# Patient Record
Sex: MALE | Race: OTHER | NOT HISPANIC OR LATINO
[De-identification: names, ages, dates, MRNs, and addresses within clinical notes are randomized per-mention and may not be internally consistent; named-entity substitution may affect disease eponyms.]

---

## 2023-05-16 PROBLEM — Z00.00 ENCOUNTER FOR PREVENTIVE HEALTH EXAMINATION: Status: ACTIVE | Noted: 2023-05-16

## 2023-05-17 ENCOUNTER — APPOINTMENT (OUTPATIENT)
Dept: UROLOGY | Facility: CLINIC | Age: 43
End: 2023-05-17
Payer: COMMERCIAL

## 2023-05-18 ENCOUNTER — APPOINTMENT (OUTPATIENT)
Dept: UROLOGY | Facility: CLINIC | Age: 43
End: 2023-05-18
Payer: COMMERCIAL

## 2023-05-18 VITALS
OXYGEN SATURATION: 97 % | RESPIRATION RATE: 16 BRPM | SYSTOLIC BLOOD PRESSURE: 122 MMHG | DIASTOLIC BLOOD PRESSURE: 69 MMHG | BODY MASS INDEX: 23.1 KG/M2 | HEIGHT: 71 IN | WEIGHT: 165 LBS | TEMPERATURE: 97.5 F | HEART RATE: 66 BPM

## 2023-05-18 PROCEDURE — 99204 OFFICE O/P NEW MOD 45 MIN: CPT

## 2023-05-20 NOTE — HISTORY OF PRESENT ILLNESS
[FreeTextEntry1] : Language: English\par Date of First visit: 05/16/2023 \par Accompanied by: self\par Contact info: \par Referring Provider/PCP: Dr. white\par Fax: \par \par \par \par CC/ Problem List:\par \par ===============================================================================\par FIRST VISIT / Summary:\par Very pleasant 42 year old M here for ED, he notes intermittent issues prior to ejaculation. Still has morning erections. He rates around 1-5 / 10 recently. He prior had cialis with good effect. We reviewed medication instructions/side effects and ER precautions.\par \par -------------------------------------------------------------------------------------------\par INTERVAL VISITS:\par \par ===============================================================================\par \par PMH: back herniated disc\par Meds: vitamins\par All: nkda\par FHx: No  malignancies. Father stent late 60's, and grandfather with CAD / MI\par SocHx: nonsmoker, social etoh\par \par PSH: none\par \par \par ROS: Review of Systems is as per HPI unless otherwise denoted below\par \par \par ===============================================================================\par DATA: \par \par LABS (SELECTED):---------------------------------------------------------------------------------------------------\par \par \par RADS:-------------------------------------------------------------------------------------------------------------------\par \par \par PATHOLOGY/CYTOLOGY:-------------------------------------------------------------------------------------------\par \par \par VOIDING STUDIES: ----------------------------------------------------------------------------------------------------\par \par \par STONE STUDIES: (Analysis/LLSA)----------------------------------------------------------------------------------\par \par \par PROCEDURES: -----------------------------------------------------------------------------------------------\par \par \par \par \par ===============================================================================\par \par PHYSICAL EXAM:\par \par GEN: AAOx3, NAD\par \par PSYCH: Appropriate Behavior, Affect Congruent\par \par Lungs: No labored breathing\par \par GAIT: Gait normal, Stability good\par \par ABD: no suprapubic or CVAT\par \par \par  FOCUSED: ----------------------------------------------------------------------------------------------------------------\par \par \par =======================================================================================\par DISCUSSION: \par PDE5 inhibitor information:\par The risks of this medication include facial redness and/or flushing, reflux (indigestion), headache, back pain, an erection that won't go away, chest pain or heart attack, dizziness, drop in blood pressure, loss of vision, blue vision, blurry vision and loss of hearing. The patient understands that he cannot take together with nitrates and that should he develop chest pain, he must alert emergency personnel if he has taken within the last 24 hours.  \par \par I recommend that you take the first dose by yourself so you can get acquainted with how this medication makes you feel and whether or not you will have any of the above side effects. The medication may work as soon as 20 minutes on an empty stomach but it can take up to 60 minutes (or longer) on a full stomach. The medication does requires sexual stimulation to work. If you take it without sexual stimulation, you will not get an erection. Please come to the ER for an erection that won't go away, chest pain, or loss of vision or hearing. The patient understood all of these instructions.\par \par The patient has been screened for potential medication interactions. He is not on any po antifungals or HIV meds (protease inhibitors, NNRTI's). \par \par =======================================================================================\par ASSESSMENT and PLAN\par \par \par 1. ED\par - desires trial of both moderate sildenafil and tadalafil to decide which he prefers. Knows to take separately, not more often than every othr day. Can start with 1/2 tablet and take up to 2 tablets.\par - Follow-up in 6 months\par \par 2. Testosterone measurement\par - he will decide if he wishes to have hormonal labs checked. Orders placed\par \par =======================================================================================\par \par Thank you for allowing me to assist in the care of your patient. Should you have any questions please do not hesitate to reach out to me.\par \par \par Og Hernandes MD                                                         \par \par [place] office:                                                             [place] Office:\par \par [facility]                                [facility]\par [facility] [place]\par [address], [address]                                         [address], 1st floor\par [place], [place] [id number] [place] [id number]\par T: [phone]                                                              T: [phone]\par F: [phone]                                                              F: [phone]

## 2023-09-05 ENCOUNTER — APPOINTMENT (OUTPATIENT)
Dept: UROLOGY | Facility: CLINIC | Age: 43
End: 2023-09-05
Payer: COMMERCIAL

## 2023-09-05 VITALS
HEIGHT: 71 IN | DIASTOLIC BLOOD PRESSURE: 64 MMHG | TEMPERATURE: 97.5 F | HEART RATE: 73 BPM | RESPIRATION RATE: 16 BRPM | OXYGEN SATURATION: 96 % | BODY MASS INDEX: 23.1 KG/M2 | SYSTOLIC BLOOD PRESSURE: 107 MMHG | WEIGHT: 165 LBS

## 2023-09-05 PROCEDURE — 99214 OFFICE O/P EST MOD 30 MIN: CPT

## 2023-09-05 RX ORDER — TADALAFIL 20 MG/1
20 TABLET ORAL
Qty: 30 | Refills: 3 | Status: ACTIVE | COMMUNITY
Start: 2023-09-05 | End: 1900-01-01

## 2023-09-05 RX ORDER — TADALAFIL 10 MG/1
10 TABLET, FILM COATED ORAL
Qty: 30 | Refills: 3 | Status: COMPLETED | COMMUNITY
Start: 2023-05-18 | End: 2023-09-05

## 2023-09-05 RX ORDER — SILDENAFIL 50 MG/1
50 TABLET ORAL
Qty: 30 | Refills: 3 | Status: COMPLETED | COMMUNITY
Start: 2023-05-18 | End: 2023-09-05

## 2023-09-05 RX ORDER — SILDENAFIL 100 MG/1
100 TABLET, FILM COATED ORAL
Qty: 30 | Refills: 4 | Status: ACTIVE | COMMUNITY
Start: 2023-09-05 | End: 1900-01-01

## 2023-09-05 NOTE — HISTORY OF PRESENT ILLNESS
[FreeTextEntry1] : Language: English Date of First visit: 05/16/2023 Accompanied by: self Contact info: Referring Provider/PCP: Dr. white Fax:  CC/ Problem List: ED =============================================================================== FIRST VISIT / Summary: Very pleasant 42 year old M here for ED, he notes intermittent issues prior to ejaculation. Still has morning erections. He rates around 1-5 / 10 recently. He prior had cialis with good effect. We reviewed medication instructions/side effects and ER precautions. ------------------------------------------------------------------------------------------- INTERVAL VISITS: The patient's medications and allergies were reviewed and edited below. Dated 09/05/2023  He is doing fine with medications. needed to double dose. =============================================================================== PMH: back herniated disc Meds: vitamins All: nkda FHx: No  malignancies. Father stent late 60's, and grandfather with CAD / MI SocHx: nonsmoker, social etoh  PSH: none  ROS: Review of Systems is as per HPI unless otherwise denoted below  =============================================================================== DATA: LABS (SELECTED):---------------------------------------------------------------------------------------------------   RADS:-------------------------------------------------------------------------------------------------------------------   PATHOLOGY/CYTOLOGY:-------------------------------------------------------------------------------------------   VOIDING STUDIES: ----------------------------------------------------------------------------------------------------   STONE STUDIES: (Analysis/LLSA)----------------------------------------------------------------------------------   PROCEDURES: -----------------------------------------------------------------------------------------------    ===============================================================================  PHYSICAL EXAM    FOCUSED: ----------------------------------------------------------------------------------------------------------------   =======================================================================================  DISCUSSION:  ======================================================================================= ASSESSMENT and PLAN 1. ED - desires trial of both moderate sildenafil and tadalafil to decide which he prefers. Knows to take separately, not more often than every other day. - Follow-up in 6 months  2. Testosterone measurement - he will decide if he wishes to have T  checked. Order placed  ======================================================================================= The total time personally spent preparing for this visit (reviewing test results, obtaining external history) and during the visit (ordering tests/medications, spent face to face with the patient / family and counseling them on the above), as well as after the visit (on clinical documentation and coordination with other care providers) was approximately 30 minutes.  Thank you for allowing me to assist in the care of your patient. Should you have any questions please do not hesitate to reach out to me.  Og Hernandes MD                                                             [facility]  [place] Office: [address], [address]    [place] [address]     T: [phone]     F: [phone]      [place] Office: [address] [place] [address], 1st [place] [address] T: [phone] F: [phone]

## 2023-09-08 LAB
TESTOST FREE SERPL-MCNC: 13 PG/ML
TESTOST SERPL-MCNC: 409 NG/DL

## 2023-11-10 ENCOUNTER — APPOINTMENT (OUTPATIENT)
Dept: UROLOGY | Facility: CLINIC | Age: 43
End: 2023-11-10
Payer: COMMERCIAL

## 2023-11-10 VITALS
HEART RATE: 77 BPM | WEIGHT: 165 LBS | OXYGEN SATURATION: 98 % | TEMPERATURE: 97.4 F | DIASTOLIC BLOOD PRESSURE: 72 MMHG | BODY MASS INDEX: 23.1 KG/M2 | RESPIRATION RATE: 16 BRPM | SYSTOLIC BLOOD PRESSURE: 121 MMHG | HEIGHT: 71 IN

## 2023-11-10 PROCEDURE — 99214 OFFICE O/P EST MOD 30 MIN: CPT

## 2023-11-11 LAB
ANION GAP SERPL CALC-SCNC: 13 MMOL/L
BUN SERPL-MCNC: 19 MG/DL
CALCIUM SERPL-MCNC: 9.6 MG/DL
CHLORIDE SERPL-SCNC: 103 MMOL/L
CHOLEST SERPL-MCNC: 162 MG/DL
CO2 SERPL-SCNC: 20 MMOL/L
CREAT SERPL-MCNC: 1.1 MG/DL
CRP SERPL HS-MCNC: 0.2 MG/L
EGFR: 86 ML/MIN/1.73M2
ESTIMATED AVERAGE GLUCOSE: 97 MG/DL
GLUCOSE SERPL-MCNC: 110 MG/DL
HBA1C MFR BLD HPLC: 5 %
HDLC SERPL-MCNC: 52 MG/DL
LDLC SERPL CALC-MCNC: 97 MG/DL
NONHDLC SERPL-MCNC: 110 MG/DL
POTASSIUM SERPL-SCNC: 4.1 MMOL/L
SODIUM SERPL-SCNC: 137 MMOL/L
TRIGL SERPL-MCNC: 69 MG/DL

## 2023-11-15 LAB — APO LP(A) SERPL-MCNC: 74.9 NMOL/L

## 2024-01-12 ENCOUNTER — LABORATORY RESULT (OUTPATIENT)
Age: 44
End: 2024-01-12

## 2024-01-12 ENCOUNTER — APPOINTMENT (OUTPATIENT)
Dept: CARDIOLOGY | Facility: CLINIC | Age: 44
End: 2024-01-12
Payer: COMMERCIAL

## 2024-01-12 ENCOUNTER — NON-APPOINTMENT (OUTPATIENT)
Age: 44
End: 2024-01-12

## 2024-01-12 VITALS
HEIGHT: 71 IN | BODY MASS INDEX: 23.1 KG/M2 | RESPIRATION RATE: 16 BRPM | SYSTOLIC BLOOD PRESSURE: 110 MMHG | OXYGEN SATURATION: 99 % | WEIGHT: 165 LBS | TEMPERATURE: 97.5 F | DIASTOLIC BLOOD PRESSURE: 78 MMHG | HEART RATE: 75 BPM

## 2024-01-12 PROCEDURE — 93000 ELECTROCARDIOGRAM COMPLETE: CPT

## 2024-01-12 PROCEDURE — 99203 OFFICE O/P NEW LOW 30 MIN: CPT | Mod: 25

## 2024-01-19 DIAGNOSIS — E78.00 PURE HYPERCHOLESTEROLEMIA, UNSPECIFIED: ICD-10-CM

## 2024-01-19 RX ORDER — ROSUVASTATIN CALCIUM 20 MG/1
20 TABLET, FILM COATED ORAL
Qty: 90 | Refills: 1 | Status: ACTIVE | COMMUNITY
Start: 2024-01-19 | End: 1900-01-01

## 2024-01-22 NOTE — PHYSICAL EXAM
[Well Developed] : well developed [Well Nourished] : well nourished [No Acute Distress] : no acute distress [Normal Conjunctiva] : normal conjunctiva [Normal Venous Pressure] : normal venous pressure [No Carotid Bruit] : no carotid bruit [Normal S1, S2] : normal S1, S2 [No Murmur] : no murmur [No Rub] : no rub [No Gallop] : no gallop [Clear Lung Fields] : clear lung fields [Good Air Entry] : good air entry [No Respiratory Distress] : no respiratory distress  [Soft] : abdomen soft [Non Tender] : non-tender [No Masses/organomegaly] : no masses/organomegaly [Normal Bowel Sounds] : normal bowel sounds [Normal Gait] : normal gait [No Edema] : no edema [No Cyanosis] : no cyanosis [No Clubbing] : no clubbing [No Varicosities] : no varicosities [No Rash] : no rash [No Skin Lesions] : no skin lesions [Moves all extremities] : moves all extremities [No Focal Deficits] : no focal deficits [Normal Speech] : normal speech [Alert and Oriented] : alert and oriented [Normal memory] : normal memory [5th Left ICS - MCL] : palpated at the 5th LICS in the midclavicular line [Normal] : normal [No Precordial Heave] : no precordial heave was noted [Normal Rate] : normal [Normal S1] : normal S1 [Normal S2] : normal S2 [II] : a grade 2 [No Pitting Edema] : no pitting edema present [2+] : left 2+ [No Abnormalities] : the abdominal aorta was not enlarged and no bruit was heard [S3] : no S3 [S4] : no S4 [Right Carotid Bruit] : no bruit heard over the right carotid [Left Carotid Bruit] : no bruit heard over the left carotid [Right Femoral Bruit] : no bruit heard over the right femoral artery [Left Femoral Bruit] : no bruit heard over the left femoral artery

## 2024-01-22 NOTE — REASON FOR VISIT
[CV Risk Factors and Non-Cardiac Disease] : CV risk factors and non-cardiac disease [FreeTextEntry1] : This is a 43-year-old male with a past medical history of erectile dysfunction who presents cardiac consultation.  He states that he has persistent erectile dysfunction that was being managed by a urologist. He states that he takes sildenafil 100 mg and tadalafil 20 mg and they do not work as well as they used to and he was referred for an evaluation by his urologist. He also states that 3-4 weeks ago he experienced a chest tightness only on his left side when he took a deep breath in. The pain resolved in 3-4 hours. It was not brought on by anything in particular and spontaneously resolved. He otherwise denies shortness of breath, dizziness, syncope.   He will drink socially. He has never smoked cigarettes but admits to smoking marijuana socially. 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 He will occasionally drink coffee and tea throughout the work day.   Family history significant for father - stent @ 65, paternal grandfather had potential MI, and pacemaker. He has a sister and brother who are healthy.   He does not have a PCP.

## 2024-01-22 NOTE — DISCUSSION/SUMMARY
[FreeTextEntry1] : This is a 43-year-old male with a past medical history of erectile dysfunction who presents cardiac consultation.  He states that he has persistent erectile dysfunction that was being managed by a urologist. He states that he takes sildenafil 100 mg and tadalafil 20 mg and they do not work as well as they used to and he was referred for an evaluation by his urologist. He also states that 3-4 weeks ago he experienced a chest tightness only on his left side when he took a deep breath in. The pain resolved in 3-4 hours. It was not brought on by anything in particular and spontaneously resolved. He otherwise denies shortness of breath, dizziness, syncope.  He will drink socially. He has never smoked cigarettes but admits to smoking marijuana socially. 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 He will occasionally drink coffee and tea throughout the work day. Cardiac risk factors include positive family history of atherosclerotic heart disease (father had a stent at age 65. He will have new blood work done today for lipid profile, lipoprotein B, lipoprotein a, and SMA 20.  And hemoglobin A1c Electrocardiogram done January 12, 2024 demonstrated normal sinus rhythm rate 63 bpm is otherwise remarkable for right interventricular conduction delay. Family history significant for father - stent @ 65, paternal grandfather had potential MI, and pacemaker.  Lipoprotein a done November 10, 2023 was 75 nmol/L, cholesterol 162, triglycerides 69, HDL 52, LDL calculated 97 mg/dL, non-HDL cholesterol 110 mg/dL and hemoglobin A1c of 5%. The patient will schedule exercise stress test to rule out significant coronary artery disease.  He will schedule an echo Doppler examination to evaluate his left ventricular function, chamber size, and rule out hypertrophy. The patient will follow-up with me after above-noted diagnostic tests are completed. The patient understands that aerobic exercises must be increased to 40 minutes 4 times per week. A detailed discussion of lifestyle modification was done today. The patient has a good understanding of the diagnosis, and treatment plan. Lifestyle modification was also outlined.  Thank you for allowing participate in the care of your patient.  Please not hesitate to call if you have any further questions.

## 2024-04-25 ENCOUNTER — APPOINTMENT (OUTPATIENT)
Dept: CARDIOLOGY | Facility: CLINIC | Age: 44
End: 2024-04-25
Payer: COMMERCIAL

## 2024-04-25 ENCOUNTER — NON-APPOINTMENT (OUTPATIENT)
Age: 44
End: 2024-04-25

## 2024-04-25 VITALS
TEMPERATURE: 98 F | RESPIRATION RATE: 16 BRPM | HEART RATE: 77 BPM | BODY MASS INDEX: 24.55 KG/M2 | OXYGEN SATURATION: 98 % | DIASTOLIC BLOOD PRESSURE: 77 MMHG | HEIGHT: 71 IN | SYSTOLIC BLOOD PRESSURE: 119 MMHG | WEIGHT: 175.38 LBS

## 2024-04-25 DIAGNOSIS — R07.89 OTHER CHEST PAIN: ICD-10-CM

## 2024-04-25 DIAGNOSIS — R01.1 CARDIAC MURMUR, UNSPECIFIED: ICD-10-CM

## 2024-04-25 DIAGNOSIS — E78.00 PURE HYPERCHOLESTEROLEMIA, UNSPECIFIED: ICD-10-CM

## 2024-04-25 DIAGNOSIS — R06.09 OTHER FORMS OF DYSPNEA: ICD-10-CM

## 2024-04-25 DIAGNOSIS — N52.9 MALE ERECTILE DYSFUNCTION, UNSPECIFIED: ICD-10-CM

## 2024-04-25 PROCEDURE — 99213 OFFICE O/P EST LOW 20 MIN: CPT

## 2024-04-25 PROCEDURE — G2211 COMPLEX E/M VISIT ADD ON: CPT

## 2024-04-25 PROCEDURE — 93015 CV STRESS TEST SUPVJ I&R: CPT

## 2024-04-25 PROCEDURE — 93306 TTE W/DOPPLER COMPLETE: CPT

## 2024-04-25 NOTE — PHYSICAL EXAM
[Well Developed] : well developed [Well Nourished] : well nourished [No Acute Distress] : no acute distress [Normal Conjunctiva] : normal conjunctiva [Normal Venous Pressure] : normal venous pressure [No Carotid Bruit] : no carotid bruit [Normal S1, S2] : normal S1, S2 [No Rub] : no rub [5th Left ICS - MCL] : palpated at the 5th LICS in the midclavicular line [Normal] : normal [No Precordial Heave] : no precordial heave was noted [Normal Rate] : normal [Rhythm Regular] : regular [Normal S1] : normal S1 [Normal S2] : normal S2 [No Gallop] : no gallop heard [II] : a grade 2 [No Pitting Edema] : no pitting edema present [2+] : left 2+ [No Abnormalities] : the abdominal aorta was not enlarged and no bruit was heard [Clear Lung Fields] : clear lung fields [Good Air Entry] : good air entry [No Respiratory Distress] : no respiratory distress  [Soft] : abdomen soft [Non Tender] : non-tender [No Masses/organomegaly] : no masses/organomegaly [Normal Bowel Sounds] : normal bowel sounds [Normal Gait] : normal gait [No Edema] : no edema [No Cyanosis] : no cyanosis [No Clubbing] : no clubbing [No Varicosities] : no varicosities [No Rash] : no rash [No Skin Lesions] : no skin lesions [Moves all extremities] : moves all extremities [No Focal Deficits] : no focal deficits [Normal Speech] : normal speech [Alert and Oriented] : alert and oriented [Normal memory] : normal memory [Apical Thrill] : no thrill palpable at the apex [S3] : no S3 [S4] : no S4 [Click] : no click [Distant] : the heart sounds were ~L not distant [Pericardial Rub] : no pericardial rub [Rt] : no varicose veins of the right leg [Lt] : no varicose veins of the left leg [Right Carotid Bruit] : no bruit heard over the right carotid [Left Carotid Bruit] : no bruit heard over the left carotid [Right Femoral Bruit] : no bruit heard over the right femoral artery [Left Femoral Bruit] : no bruit heard over the left femoral artery

## 2024-04-25 NOTE — DISCUSSION/SUMMARY
[FreeTextEntry1] : This is a 43-year-old male with a past medical history of erectile dysfunction who presents cardiac follow-up evaluation.  He had been complaining of transient chest tightness associated with deep breathing, in January 2024. He denies chest pain, shortness of breath, dizziness or syncope. The patient had a normal exercise stress test April 25, 2024. Lipid panel done January 12, 2024 demonstrated an elevated lipoprotein a of 86 nmol/L, lipoprotein B of 99 mg/dL, cholesterol 199 mg/dL, HDL of 56 mg/dL, LDL calculated Agnone 31 mg/dL, triglycerides 64 mg/dL and non-HDL cholesterol 143 mg/dL. He will continue on his current diet and exercise program.  He does exercise on a regular basis. PMH: He states that he has persistent erectile dysfunction that was being managed by a urologist. He states that he takes sildenafil 100 mg and tadalafil 20 mg and they do not work as well as they used to. In December 2023 January 2024 he experienced a chest tightness only on his left side when he took a deep breath in. The pain resolved in 3-4 hours. It was not brought on by anything in particular and spontaneously resolved. He otherwise denies shortness of breath, dizziness, syncope.  Cardiac risk factors include positive family history of atherosclerotic heart disease (father had a stent at age 65. Electrocardiogram done January 12, 2024 demonstrated normal sinus rhythm rate 63 bpm is otherwise remarkable for right interventricular conduction delay. Family history significant for father - stent @ 65, paternal grandfather had potential MI, and pacemaker.  Lipoprotein a done November 10, 2023 was 75 nmol/L, cholesterol 162, triglycerides 69, HDL 52, LDL calculated 97 mg/dL, non-HDL cholesterol 110 mg/dL and hemoglobin A1c of 5%.  The patient understands that aerobic exercises must be increased to 40 minutes 4 times per week. A detailed discussion of lifestyle modification was done today. The patient has a good understanding of the diagnosis, and treatment plan. Lifestyle modification was also outlined.  Thank you for allowing participate in the care of your patient.  Please not hesitate to call if you have any further questions.

## 2024-04-25 NOTE — REASON FOR VISIT
[CV Risk Factors and Non-Cardiac Disease] : CV risk factors and non-cardiac disease [FreeTextEntry1] : This is a 43-year-old male with a past medical history of hyperlipidemia and erectile dysfunction who presents for follow up cardiac evaluation. Cardiac risk factors include hyperlipidemia and family history (father- stent at 65; paternal grandfather- MI, and pacemaker). Pt denies history of smoking, diabetes, hypertension, rheumatic fever, and does not drink excessive caffeine or alcohol.  Today pt is feeling well but does complain of occasional dyspnea on exertion, especially when going up multiple flights of stairs or long distances. He denies chest pain, shortness of breath, palpitation, dizziness, headaches, or syncope.  Lipid panel 01/12/2024 showed total cholesterol 199, HDL 56, LDL calc 131, non-, triglycerides 64. Lipoprotein (a) 85.9, apolipoprotein B 99, C-RP < 3.  Plan today for exercise stress test to further assess dyspnea on exertion and cardiac risk.   [facility] He states that he has persistent erectile dysfunction that was being managed by a urologist. He states that he takes sildenafil 100 mg and tadalafil 20 mg and they do not work as well as they used to and he was referred for an evaluation by his urologist. He also states that 3-4 weeks ago he experienced a chest tightness only on his left side when he took a deep breath in. The pain resolved in 3-4 hours. It was not brought on by anything in particular and spontaneously resolved. He otherwise denies shortness of breath, dizziness, syncope.   He will drink socially. He has never smoked cigarettes but admits to smoking marijuana socially. 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 He will occasionally drink coffee and tea throughout the work day.   He does not have a PCP.

## 2024-05-13 ENCOUNTER — APPOINTMENT (OUTPATIENT)
Dept: UROLOGY | Facility: CLINIC | Age: 44
End: 2024-05-13

## 2024-07-29 ENCOUNTER — RX RENEWAL (OUTPATIENT)
Age: 44
End: 2024-07-29

## 2024-12-03 ENCOUNTER — APPOINTMENT (OUTPATIENT)
Dept: CARDIOLOGY | Facility: CLINIC | Age: 44
End: 2024-12-03

## 2024-12-05 ENCOUNTER — APPOINTMENT (OUTPATIENT)
Dept: CARDIOLOGY | Facility: CLINIC | Age: 44
End: 2024-12-05

## 2025-02-11 ENCOUNTER — APPOINTMENT (OUTPATIENT)
Age: 45
End: 2025-02-11

## 2025-04-04 ENCOUNTER — NON-APPOINTMENT (OUTPATIENT)
Age: 45
End: 2025-04-04

## 2025-04-04 ENCOUNTER — APPOINTMENT (OUTPATIENT)
Dept: UROLOGY | Facility: CLINIC | Age: 45
End: 2025-04-04
Payer: COMMERCIAL

## 2025-04-04 VITALS
TEMPERATURE: 98 F | DIASTOLIC BLOOD PRESSURE: 77 MMHG | BODY MASS INDEX: 25.2 KG/M2 | HEART RATE: 80 BPM | SYSTOLIC BLOOD PRESSURE: 120 MMHG | OXYGEN SATURATION: 98 % | WEIGHT: 180 LBS | HEIGHT: 71 IN | RESPIRATION RATE: 16 BRPM

## 2025-04-04 DIAGNOSIS — N52.9 MALE ERECTILE DYSFUNCTION, UNSPECIFIED: ICD-10-CM

## 2025-04-04 DIAGNOSIS — E78.00 PURE HYPERCHOLESTEROLEMIA, UNSPECIFIED: ICD-10-CM

## 2025-04-04 PROCEDURE — 99214 OFFICE O/P EST MOD 30 MIN: CPT

## 2025-09-09 ENCOUNTER — NON-APPOINTMENT (OUTPATIENT)
Age: 45
End: 2025-09-09